# Patient Record
Sex: FEMALE | Race: WHITE | NOT HISPANIC OR LATINO
[De-identification: names, ages, dates, MRNs, and addresses within clinical notes are randomized per-mention and may not be internally consistent; named-entity substitution may affect disease eponyms.]

---

## 2019-06-25 ENCOUNTER — TRANSCRIBE ORDERS (OUTPATIENT)
Dept: SCHEDULING | Age: 24
End: 2019-06-25

## 2019-06-25 DIAGNOSIS — N83.291 OTHER OVARIAN CYST, RIGHT SIDE: Primary | ICD-10-CM

## 2019-07-08 ENCOUNTER — HOSPITAL ENCOUNTER (OUTPATIENT)
Dept: RADIOLOGY | Facility: HOSPITAL | Age: 24
Discharge: HOME | End: 2019-07-08
Attending: PHYSICIAN ASSISTANT
Payer: COMMERCIAL

## 2019-07-08 VITALS — WEIGHT: 120 LBS

## 2019-07-08 DIAGNOSIS — N83.291 OTHER OVARIAN CYST, RIGHT SIDE: ICD-10-CM

## 2019-07-08 PROCEDURE — 72197 MRI PELVIS W/O & W/DYE: CPT

## 2019-07-08 PROCEDURE — A9579 GAD-BASE MR CONTRAST NOS,1ML: HCPCS

## 2019-07-08 PROCEDURE — A9585 GADOBUTROL INJECTION: HCPCS

## 2019-07-08 RX ORDER — GADOBUTROL 604.72 MG/ML
0.1 INJECTION INTRAVENOUS ONCE
Status: COMPLETED | OUTPATIENT
Start: 2019-07-08 | End: 2019-07-08

## 2019-07-08 RX ADMIN — GADOBUTROL 6 ML: 604.72 INJECTION INTRAVENOUS at 14:51

## 2020-09-12 ENCOUNTER — EMERGENCY (EMERGENCY)
Facility: HOSPITAL | Age: 25
LOS: 1 days | Discharge: HOME | End: 2020-09-12
Attending: EMERGENCY MEDICINE
Payer: COMMERCIAL

## 2020-09-12 VITALS
RESPIRATION RATE: 15 BRPM | SYSTOLIC BLOOD PRESSURE: 114 MMHG | OXYGEN SATURATION: 100 % | DIASTOLIC BLOOD PRESSURE: 76 MMHG | TEMPERATURE: 99 F | HEART RATE: 68 BPM

## 2020-09-12 VITALS
WEIGHT: 119.93 LBS | SYSTOLIC BLOOD PRESSURE: 113 MMHG | TEMPERATURE: 98 F | RESPIRATION RATE: 18 BRPM | DIASTOLIC BLOOD PRESSURE: 77 MMHG | OXYGEN SATURATION: 98 % | HEART RATE: 85 BPM | HEIGHT: 63 IN

## 2020-09-12 DIAGNOSIS — R10.31 RIGHT LOWER QUADRANT PAIN: ICD-10-CM

## 2020-09-12 LAB
ALBUMIN SERPL ELPH-MCNC: 5.5 G/DL — HIGH (ref 3.3–5)
ALP SERPL-CCNC: 41 U/L — SIGNIFICANT CHANGE UP (ref 40–120)
ALT FLD-CCNC: 11 U/L — SIGNIFICANT CHANGE UP (ref 10–45)
ANION GAP SERPL CALC-SCNC: 15 MMOL/L — SIGNIFICANT CHANGE UP (ref 5–17)
APTT BLD: 32.2 SEC — SIGNIFICANT CHANGE UP (ref 27.5–35.5)
AST SERPL-CCNC: 17 U/L — SIGNIFICANT CHANGE UP (ref 10–40)
BASE EXCESS BLDV CALC-SCNC: 1.4 MMOL/L — SIGNIFICANT CHANGE UP (ref -2–2)
BASOPHILS # BLD AUTO: 0.02 K/UL — SIGNIFICANT CHANGE UP (ref 0–0.2)
BASOPHILS NFR BLD AUTO: 0.3 % — SIGNIFICANT CHANGE UP (ref 0–2)
BILIRUB SERPL-MCNC: 0.5 MG/DL — SIGNIFICANT CHANGE UP (ref 0.2–1.2)
BUN SERPL-MCNC: 10 MG/DL — SIGNIFICANT CHANGE UP (ref 7–23)
CA-I SERPL-SCNC: 1.3 MMOL/L — SIGNIFICANT CHANGE UP (ref 1.12–1.3)
CALCIUM SERPL-MCNC: 10.7 MG/DL — HIGH (ref 8.4–10.5)
CHLORIDE BLDV-SCNC: 104 MMOL/L — SIGNIFICANT CHANGE UP (ref 96–108)
CHLORIDE SERPL-SCNC: 101 MMOL/L — SIGNIFICANT CHANGE UP (ref 96–108)
CO2 BLDV-SCNC: 27 MMOL/L — SIGNIFICANT CHANGE UP (ref 22–30)
CO2 SERPL-SCNC: 24 MMOL/L — SIGNIFICANT CHANGE UP (ref 22–31)
CREAT SERPL-MCNC: 0.7 MG/DL — SIGNIFICANT CHANGE UP (ref 0.5–1.3)
EOSINOPHIL # BLD AUTO: 0.03 K/UL — SIGNIFICANT CHANGE UP (ref 0–0.5)
EOSINOPHIL NFR BLD AUTO: 0.4 % — SIGNIFICANT CHANGE UP (ref 0–6)
GAS PNL BLDV: 142 MMOL/L — SIGNIFICANT CHANGE UP (ref 135–145)
GAS PNL BLDV: SIGNIFICANT CHANGE UP
GAS PNL BLDV: SIGNIFICANT CHANGE UP
GLUCOSE BLDV-MCNC: 83 MG/DL — SIGNIFICANT CHANGE UP (ref 70–99)
GLUCOSE SERPL-MCNC: 87 MG/DL — SIGNIFICANT CHANGE UP (ref 70–99)
HCG SERPL-ACNC: <2 MIU/ML — SIGNIFICANT CHANGE UP
HCO3 BLDV-SCNC: 26 MMOL/L — SIGNIFICANT CHANGE UP (ref 21–29)
HCT VFR BLD CALC: 35.3 % — SIGNIFICANT CHANGE UP (ref 34.5–45)
HCT VFR BLDA CALC: 37 % — LOW (ref 39–50)
HGB BLD CALC-MCNC: 11.9 G/DL — SIGNIFICANT CHANGE UP (ref 11.5–15.5)
HGB BLD-MCNC: 11.7 G/DL — SIGNIFICANT CHANGE UP (ref 11.5–15.5)
IMM GRANULOCYTES NFR BLD AUTO: 0.1 % — SIGNIFICANT CHANGE UP (ref 0–1.5)
INR BLD: 1.14 RATIO — SIGNIFICANT CHANGE UP (ref 0.88–1.16)
LACTATE BLDV-MCNC: 1.3 MMOL/L — SIGNIFICANT CHANGE UP (ref 0.7–2)
LYMPHOCYTES # BLD AUTO: 2.87 K/UL — SIGNIFICANT CHANGE UP (ref 1–3.3)
LYMPHOCYTES # BLD AUTO: 38.5 % — SIGNIFICANT CHANGE UP (ref 13–44)
MCHC RBC-ENTMCNC: 30.7 PG — SIGNIFICANT CHANGE UP (ref 27–34)
MCHC RBC-ENTMCNC: 33.1 GM/DL — SIGNIFICANT CHANGE UP (ref 32–36)
MCV RBC AUTO: 92.7 FL — SIGNIFICANT CHANGE UP (ref 80–100)
MONOCYTES # BLD AUTO: 0.44 K/UL — SIGNIFICANT CHANGE UP (ref 0–0.9)
MONOCYTES NFR BLD AUTO: 5.9 % — SIGNIFICANT CHANGE UP (ref 2–14)
NEUTROPHILS # BLD AUTO: 4.08 K/UL — SIGNIFICANT CHANGE UP (ref 1.8–7.4)
NEUTROPHILS NFR BLD AUTO: 54.8 % — SIGNIFICANT CHANGE UP (ref 43–77)
NRBC # BLD: 0 /100 WBCS — SIGNIFICANT CHANGE UP (ref 0–0)
OTHER CELLS CSF MANUAL: 4 ML/DL — LOW (ref 16–22)
PCO2 BLDV: 43 MMHG — SIGNIFICANT CHANGE UP (ref 35–50)
PH BLDV: 7.4 — SIGNIFICANT CHANGE UP (ref 7.35–7.45)
PLATELET # BLD AUTO: 299 K/UL — SIGNIFICANT CHANGE UP (ref 150–400)
PO2 BLDV: <20 MMHG — LOW (ref 25–45)
POTASSIUM BLDV-SCNC: 3.7 MMOL/L — SIGNIFICANT CHANGE UP (ref 3.5–5.3)
POTASSIUM SERPL-MCNC: 3.9 MMOL/L — SIGNIFICANT CHANGE UP (ref 3.5–5.3)
POTASSIUM SERPL-SCNC: 3.9 MMOL/L — SIGNIFICANT CHANGE UP (ref 3.5–5.3)
PROT SERPL-MCNC: 8.1 G/DL — SIGNIFICANT CHANGE UP (ref 6–8.3)
PROTHROM AB SERPL-ACNC: 13.5 SEC — SIGNIFICANT CHANGE UP (ref 10.6–13.6)
RBC # BLD: 3.81 M/UL — SIGNIFICANT CHANGE UP (ref 3.8–5.2)
RBC # FLD: 11.7 % — SIGNIFICANT CHANGE UP (ref 10.3–14.5)
SAO2 % BLDV: 25 % — LOW (ref 67–88)
SODIUM SERPL-SCNC: 140 MMOL/L — SIGNIFICANT CHANGE UP (ref 135–145)
WBC # BLD: 7.45 K/UL — SIGNIFICANT CHANGE UP (ref 3.8–10.5)
WBC # FLD AUTO: 7.45 K/UL — SIGNIFICANT CHANGE UP (ref 3.8–10.5)

## 2020-09-12 PROCEDURE — 93975 VASCULAR STUDY: CPT | Mod: 26

## 2020-09-12 PROCEDURE — 83605 ASSAY OF LACTIC ACID: CPT

## 2020-09-12 PROCEDURE — 76830 TRANSVAGINAL US NON-OB: CPT

## 2020-09-12 PROCEDURE — 82330 ASSAY OF CALCIUM: CPT

## 2020-09-12 PROCEDURE — 85730 THROMBOPLASTIN TIME PARTIAL: CPT

## 2020-09-12 PROCEDURE — 99283 EMERGENCY DEPT VISIT LOW MDM: CPT

## 2020-09-12 PROCEDURE — 85025 COMPLETE CBC W/AUTO DIFF WBC: CPT

## 2020-09-12 PROCEDURE — 85610 PROTHROMBIN TIME: CPT

## 2020-09-12 PROCEDURE — 84132 ASSAY OF SERUM POTASSIUM: CPT

## 2020-09-12 PROCEDURE — 99284 EMERGENCY DEPT VISIT MOD MDM: CPT | Mod: 25

## 2020-09-12 PROCEDURE — 80053 COMPREHEN METABOLIC PANEL: CPT

## 2020-09-12 PROCEDURE — 82565 ASSAY OF CREATININE: CPT

## 2020-09-12 PROCEDURE — 82435 ASSAY OF BLOOD CHLORIDE: CPT

## 2020-09-12 PROCEDURE — 85014 HEMATOCRIT: CPT

## 2020-09-12 PROCEDURE — 86850 RBC ANTIBODY SCREEN: CPT

## 2020-09-12 PROCEDURE — 76830 TRANSVAGINAL US NON-OB: CPT | Mod: 26

## 2020-09-12 PROCEDURE — 99285 EMERGENCY DEPT VISIT HI MDM: CPT

## 2020-09-12 PROCEDURE — 85018 HEMOGLOBIN: CPT

## 2020-09-12 PROCEDURE — 93975 VASCULAR STUDY: CPT

## 2020-09-12 PROCEDURE — 86900 BLOOD TYPING SEROLOGIC ABO: CPT

## 2020-09-12 PROCEDURE — 86901 BLOOD TYPING SEROLOGIC RH(D): CPT

## 2020-09-12 PROCEDURE — 82803 BLOOD GASES ANY COMBINATION: CPT

## 2020-09-12 PROCEDURE — 84702 CHORIONIC GONADOTROPIN TEST: CPT

## 2020-09-12 PROCEDURE — 82947 ASSAY GLUCOSE BLOOD QUANT: CPT

## 2020-09-12 PROCEDURE — 84295 ASSAY OF SERUM SODIUM: CPT

## 2020-09-12 PROCEDURE — 96374 THER/PROPH/DIAG INJ IV PUSH: CPT

## 2020-09-12 RX ORDER — SODIUM CHLORIDE 9 MG/ML
1000 INJECTION, SOLUTION INTRAVENOUS ONCE
Refills: 0 | Status: COMPLETED | OUTPATIENT
Start: 2020-09-12 | End: 2020-09-12

## 2020-09-12 RX ORDER — SODIUM CHLORIDE 9 MG/ML
1000 INJECTION INTRAMUSCULAR; INTRAVENOUS; SUBCUTANEOUS ONCE
Refills: 0 | Status: DISCONTINUED | OUTPATIENT
Start: 2020-09-12 | End: 2020-09-12

## 2020-09-12 RX ORDER — ONDANSETRON 8 MG/1
4 TABLET, FILM COATED ORAL ONCE
Refills: 0 | Status: COMPLETED | OUTPATIENT
Start: 2020-09-12 | End: 2020-09-12

## 2020-09-12 RX ORDER — ACETAMINOPHEN 500 MG
975 TABLET ORAL ONCE
Refills: 0 | Status: COMPLETED | OUTPATIENT
Start: 2020-09-12 | End: 2020-09-12

## 2020-09-12 RX ADMIN — SODIUM CHLORIDE 1000 MILLILITER(S): 9 INJECTION, SOLUTION INTRAVENOUS at 19:03

## 2020-09-12 RX ADMIN — Medication 975 MILLIGRAM(S): at 18:42

## 2020-09-12 RX ADMIN — ONDANSETRON 4 MILLIGRAM(S): 8 TABLET, FILM COATED ORAL at 18:41

## 2020-09-12 NOTE — CONSULT NOTE ADULT - ATTENDING COMMENTS
Pt seen and examined. I agree with above assessment and plan. Pelvic sono revealed minimal free fluid. no dominant cyst noted. +flow bilateral ovaries. clinical picture c/w ruptured cyst no evidence of torsion. analgesia prn. pt referred to GYN near her house in [place]. All questions answered.

## 2020-09-12 NOTE — ED PROVIDER NOTE - NS ED ROS FT
Gen: Denies fever, weight loss  CV: Denies chest pain, palpitations  Skin: Denies rash, erythema, color changes  Resp: Denies SOB, cough  Endo: Denies sensitivity to heat, cold, increased urination  GI: Denies constipation, vomiting, + nausea + lower abd pain   Msk: Denies back pain, LE swelling, extremity pain  : Denies dysuria, increased frequency, no vaginal discharge or bleed  Neuro: Denies LOC, weakness, seizures  Psych: Denies hx of psych, hallucinations

## 2020-09-12 NOTE — ED PROVIDER NOTE - CARE PLAN
Principal Discharge DX:	Right lower quadrant abdominal pain   Principal Discharge DX:	Right lower quadrant abdominal pain  Secondary Diagnosis:	Ruptured ovarian cyst

## 2020-09-12 NOTE — ED PROVIDER NOTE - CARE PROVIDER_API CALL
JOEL JOHNSON  Obstetrics and Gynecology  [address], [address]  [place] [address]  Phone: [phone]  Fax: [phone]  Follow Up Time: 7-10 Days

## 2020-09-12 NOTE — ED PROVIDER NOTE - PROGRESS NOTE DETAILS
Contacted US tech upon initial eval in room to expedite TVUS. - Feliz Raines MD OB Consulted Contacted US tech upon initial eval in room to expedite TVUS. Tech states can perform study next. - Feliz Raines MD OBGYN resident evaluated patient. Speaking to attending. Pt being brought to US now. - Feliz Raines MD Justice Morris MD: Pain improved. Pt with likely ruptured ovarian cyst. Will d/c with OBGYN follow up. Pt re-evaluated. Feels sig improved. TVUS without evidence of torsion, +free fluid in cul-de-sac. Seen by obgyn who recommends d/c with outpatient f/u. Pt re-evaluated. Tolerating PO. Continues to have RLQ ttp, however primarily at inguinal crease. Minimal ttp at mcburney's point. Neg rovsing. Given findings on US, lack of elevated wbc, pt states feels like previous episodes of ovarian pain, low suspicion of appendicitis at this time, however cannot exclude. Discussed risks/benefits of ct a/p with patient, shared decision making, pt wishes to not have ct done due to radiation risks. Strict return precautions of s/s appendicitis discussed, pt expresses understanding and will return immediately if they present. An opportunity to ask questions was provided and all answered. - Feliz Raines MD

## 2020-09-12 NOTE — ED PROVIDER NOTE - PATIENT PORTAL LINK FT
You can access the FollowMyHealth Patient Portal offered by [facility] by registering at the following website: [email or web address]. By joining [facility]’s FollowMyHealth portal, you will also be able to view your health information using other applications (apps) compatible with our system.

## 2020-09-12 NOTE — ED PROVIDER NOTE - NSFOLLOWUPINSTRUCTIONS_ED_ALL_ED_FT
-You were seen in the Emergency Department (ED) for abdominal pain that likely was from a ruptured ovarian cyst. Lab and imaging results, if performed, were discussed with you along with your discharge diagnosis.    MEDICATIONS:  -Continue all other prescribed medicine, IF ANY, as per your primary care doctor's (PMD) recommendations.    PAIN CONTROL:  -Please take over the counter Tylenol (also known as acetaminophen) 650mg every 6 hours or Ibuprofen (also known as motrin, advil) 600mg every 8 hour for your pain, IF ANY, unless you are not supposed to for any reason.  -Rest, stay hydrated with plenty of fluids (drink at least 2 Liters or 64 Ounces of water each day UNLESS you are supposed to restrict fluids or ANY reason.    FOLLOW-UP:  -Please follow up with your PMD if symptoms return or for any concerning matter pertaining to your ruptured ovarian cyst  -Please follow up with your private physician within the next 72 hours. Tell them you were recently in the ED for an urgent issue and would like to be seen. Bring copies of your results if you were given.   -If you do not have a PMD, please call [phone] to find one convenient for you or call our clinic at [phone].    RETURN PRECAUTIONS:  -Please return to the Emergency Department if you experience ANY new or concerning symptoms, such as, but not limited to: worsening pain, large amount of bleeding, passing out, fever >100.F, shaking chills, inability to see or new double vision, chest pain, difficulty breathing, diffuse abdominal pain, unable to eat or drink, continuous vomiting or diarrhea, unable to move or feel part of your body

## 2020-09-12 NOTE — ED ADULT NURSE NOTE - OBJECTIVE STATEMENT
24 y.o F A&Ox4 with PMH of dermoid cyst (removed 1 year ago), presents to the ED c.o sudden onset of R pelvic pain x 3 hours ago. Pt. reports she was sitting in car when she suddenly developed R sided pelvic pain. States she went to urgent care and was sent to ED for imaging for r/o ovarian torsion. Reports she had dermoid cyst removed last year to R ovary - states pain is different. Describes pain as constant 8/10 "pulling" sensation. Reports pain has slightly improved since arrival to the ED. Denies taking pain medication prior to arrival. LMP last week. Denies vaginal bleeding, spotting or discharge. Denies urinary symptoms, fevers, CP, SOB, fever, chills, HA, back pain. Endorses nausea, denies any episodes of emesis. IV access obtained. Safety and comfort provided.

## 2020-09-12 NOTE — CONSULT NOTE ADULT - ASSESSMENT
23yo GO LMP 9/5 h/o dermoid cyst s/p lsc resection in 2019 presents with abrupt RLQ pain since 3hrs with bilateral adnexal tenderness and rebound tenderness in RLQ.  Differential diagnosis includes ovarian torsion, ruptured cyst, hemorrhagic cyst, appendicitis.  Patient received tylenol with minimal relief. Patient en route to radiology for TVUS at this time.  Vital signs stable.

## 2020-09-12 NOTE — ED PROVIDER NOTE - PHYSICAL EXAMINATION
Gen: WDWN, NAD, in some distress  HEENT: EOMI, no nasal discharge, mucous membranes moist  CV: RRR, +S1/S2, no M/R/G  Resp: CTAB, no W/R/R  GI: Abdomen soft non-distended, TTP RLQ w/ neg rebound, neg kirby, neg straight leg raise  : no discharge, blood present at vaginal introitus, R ovarian tenderness on bimanual  MSK: No open wounds, no bruising, no LE edema  Neuro: A&Ox4, following commands, moving all four extremities spontaneously  Psych: appropriate mood, denies AH, VH, SI

## 2020-09-12 NOTE — ED PROVIDER NOTE - ATTENDING CONTRIBUTION TO CARE
24F, pmh dermoid cyst (s/p excision last year), denies pmh, presents with severe R-sided pelvic pain x 2-3 hours. Pt states pain came on gradually, rapidly progressing to severe, Constant. 8-9/10. A/w nausea, no vomiting. Pt states feels like pain she experienced after surgery last year. LMP concluded a few days ago. No diarrhea or changes in stools. No dysuria, hematuria. No flank pain. Pt seen at , was referred to ED. Neg upreg at . Denies vag bleeding or d/c.    PE: NAD, NCAT, MMM, Trachea midline, Normal conjunctiva, lungs CTAB, S1/S2 RRR, Normal perfusion, 2+ radial pulses bilat, Abdomen Soft, ND, +Suprapubic and RLQ ttp, no ttp at mcburney's point, No rebound/guarding, No LE edema, No deformity of extremities, No rashes,  No focal motor or sensory deficits.     Hx, exam concerning for potential torsion. Expedite TVUS and consult obgyn. Much lower suspicion of appendicitis or other acute intra-abd pathology. Check labs. Urine. TVUS. Pt only wants tylenol at this time. Monitor closely. Appreciate obgyn recs. - Feliz Raines MD

## 2020-09-12 NOTE — ED PROVIDER NOTE - OBJECTIVE STATEMENT
24F hx dermoid cyst (s/p cystectomy last year) w/o other PMH p/w acute RLQ pain x several hours. Pain began insidiously while in vehicle, crampy, radiating to supra-pubic region, 7-9/10 on pain scale, wax/waning, feels similar to prior ovarian pains. States LMP concluded x1 week ago, cycle was heavier than normal however denies any recent vaginal discharge, bleeding, no urinary symptoms, no other abd pain or tenderness. + nausea no vomiting. 24F hx dermoid cyst (s/p cystectomy last year) w/o other PMH p/w acute RLQ pain x several hours. Pain began insidiously while in vehicle, crampy, radiating to supra-pubic region, 7-9/10 on pain scale, wax/waning, feels similar to prior ovarian pains. States LMP concluded x1 week ago, cycle was heavier than normal however denies any recent vaginal discharge, bleeding, no urinary symptoms, no other abd pain or tenderness. + nausea no vomiting. Neg pregnancy test @ outside UC today, no prior preg. No hx STDs.

## 2020-09-12 NOTE — ED PROVIDER NOTE - CLINICAL SUMMARY MEDICAL DECISION MAKING FREE TEXT BOX
24F hx dermoid cyst s/p cystoscopy p/w RLQ pain x3 hours, on exam w/ RLQ abd TTP, R ovarian tenderness on bimanual exam, VSS - plan for labs,  UA, emergent ultrasound, pain management, reassess  for  further workup if warranted.

## 2020-09-12 NOTE — CONSULT NOTE ADULT - SUBJECTIVE AND OBJECTIVE BOX
GYN Consult Note    24y G0 LMP 9/5 PMH dermoid cyst s/p lsc resection c/b post-op ileus in 2019, presents with sudden onset RLQ pain since 3hrs ago.  Pain initially started at 10/10, patient did not take any meds and went to urgent care where she received tylenol prior to presenting to ED.  Patient states that car ride over was extremely painful due to bumps on the road.  Pain is now an 8/10 laying in bed.  Nausea, no vomiting.  Denies fevers/chills/headahce/SOB/changes in urination.  Denies recent sexual intercourse.  NPO since yesterday.      Name of GYN Physician: in [place]   Current OCP use: denies     PAST MEDICAL & SURGICAL HISTORY:  h/o dermoid cyst s/p lsc resection 2019 c/b post-op ileus      REVIEW OF SYSTEMS  General: denies fevers, chills, tiredness  Skin/Breast: denies breast pain  Respiratory and Thorax: denies shortness of breath, denies cough  Cardiovascular: denies chest pain and denies palpitations  Gastrointestinal: denies vomiting	  Genitourinary: denies dysuria, increased urinary frequency, urgency	  Constitutional, Cardiovascular, Respiratory, Gastrointestinal, Genitourinary, Musculoskeletal and Integumentary review of systems are normal except as noted. 	      Allergies  No Known Allergies      Vital Signs Last 24 Hrs  T(C): 36.7 (12 Sep 2020 18:30), Max: 36.7 (12 Sep 2020 18:30)  T(F): 98 (12 Sep 2020 18:30), Max: 98 (12 Sep 2020 18:30)  HR: 72 (12 Sep 2020 18:43) (72 - 85)  BP: 107/73 (12 Sep 2020 18:43) (94/60 - 113/77)  BP(mean): --  RR: 20 (12 Sep 2020 18:43) (18 - 20)  SpO2: 100% (12 Sep 2020 18:43) (98% - 100%)    PHYSICAL EXAM:   Gen: NAD, alert and oriented x 3  Cardiovascular: regular   Respiratory: breathing comfortably on RA  Abd: soft, non distended tender to medium palpation in RLQ, guarding, no rebound tenderness, non tender in LLQ/LUQ/RUQ  Adnexa: bilateral adnexal tenderness  Extremities: NTBL  Skin: warm and well perfused      LABS:                        11.7   7.45  )-----------( 299      ( 12 Sep 2020 18:42 )             35.3           PT/INR - ( 12 Sep 2020 18:42 )   PT: 13.5 sec;   INR: 1.14 ratio         PTT - ( 12 Sep 2020 18:42 )  PTT:32.2 sec      RADIOLOGY & ADDITIONAL STUDIES: